# Patient Record
Sex: FEMALE | Race: OTHER | Employment: PART TIME | URBAN - NONMETROPOLITAN AREA
[De-identification: names, ages, dates, MRNs, and addresses within clinical notes are randomized per-mention and may not be internally consistent; named-entity substitution may affect disease eponyms.]

---

## 2017-12-09 ENCOUNTER — HOSPITAL ENCOUNTER (EMERGENCY)
Age: 37
Discharge: HOME | End: 2017-12-09
Attending: FAMILY MEDICINE
Payer: COMMERCIAL

## 2017-12-09 ENCOUNTER — APPOINTMENT (OUTPATIENT)
Dept: CT IMAGING | Age: 37
End: 2017-12-09
Payer: COMMERCIAL

## 2017-12-09 VITALS
WEIGHT: 85 LBS | BODY MASS INDEX: 18.34 KG/M2 | RESPIRATION RATE: 16 BRPM | TEMPERATURE: 99.1 F | SYSTOLIC BLOOD PRESSURE: 103 MMHG | HEART RATE: 81 BPM | DIASTOLIC BLOOD PRESSURE: 75 MMHG | HEIGHT: 57 IN | OXYGEN SATURATION: 100 %

## 2017-12-09 DIAGNOSIS — R42 EPISODIC LIGHTHEADEDNESS: ICD-10-CM

## 2017-12-09 DIAGNOSIS — R53.1 PARESTHESIAS WITH SUBJECTIVE WEAKNESS: Primary | ICD-10-CM

## 2017-12-09 DIAGNOSIS — R20.2 PARESTHESIAS WITH SUBJECTIVE WEAKNESS: Primary | ICD-10-CM

## 2017-12-09 LAB
AMPHETAMINE+METHAMPHETAMINE URINE SCREEN: NEGATIVE
ANION GAP SERPL CALCULATED.3IONS-SCNC: 15 MEQ/L (ref 8–16)
BARBITURATE QUANTITATIVE URINE: NEGATIVE
BASOPHILS # BLD: 0.7 %
BASOPHILS ABSOLUTE: 0 THOU/MM3 (ref 0–0.1)
BENZODIAZEPINE QUANTITATIVE URINE: NEGATIVE
BILIRUBIN URINE: NEGATIVE
BLOOD, URINE: NEGATIVE
BUN BLDV-MCNC: 11 MG/DL (ref 7–22)
CALCIUM SERPL-MCNC: 9.5 MG/DL (ref 8.5–10.5)
CANNABINOID QUANTITATIVE URINE: NEGATIVE
CHARACTER, URINE: CLEAR
CHLORIDE BLD-SCNC: 100 MEQ/L (ref 98–111)
CO2: 24 MEQ/L (ref 23–33)
COCAINE METABOLITE QUANTITATIVE URINE: NEGATIVE
COLOR: YELLOW
CREAT SERPL-MCNC: 0.6 MG/DL (ref 0.4–1.2)
EKG ATRIAL RATE: 80 BPM
EKG P AXIS: 62 DEGREES
EKG P-R INTERVAL: 138 MS
EKG Q-T INTERVAL: 370 MS
EKG QRS DURATION: 82 MS
EKG QTC CALCULATION (BAZETT): 426 MS
EKG R AXIS: 58 DEGREES
EKG T AXIS: 42 DEGREES
EKG VENTRICULAR RATE: 80 BPM
EOSINOPHIL # BLD: 0.5 %
EOSINOPHILS ABSOLUTE: 0 THOU/MM3 (ref 0–0.4)
GFR SERPL CREATININE-BSD FRML MDRD: > 90 ML/MIN/1.73M2
GLUCOSE BLD-MCNC: 121 MG/DL (ref 70–108)
GLUCOSE, URINE: NEGATIVE MG/DL
HCT VFR BLD CALC: 43.5 % (ref 37–47)
HEMOGLOBIN: 14.8 GM/DL (ref 12–16)
KETONES, URINE: NEGATIVE
LEUKOCYTE EST, POC: NEGATIVE
LYMPHOCYTES # BLD: 24.9 %
LYMPHOCYTES ABSOLUTE: 1.7 THOU/MM3 (ref 1–4.8)
MCH RBC QN AUTO: 29.7 PG (ref 27–31)
MCHC RBC AUTO-ENTMCNC: 34.1 GM/DL (ref 33–37)
MCV RBC AUTO: 87.2 FL (ref 81–99)
MONOCYTES # BLD: 6.1 %
MONOCYTES ABSOLUTE: 0.4 THOU/MM3 (ref 0.4–1.3)
NITRITE, URINE: NEGATIVE
NUCLEATED RED BLOOD CELLS: 0 /100 WBC
OPIATES, URINE: NEGATIVE
OSMOLALITY CALCULATION: 278.2 MOSMOL/KG (ref 275–300)
OXYCODONE: NEGATIVE
PDW BLD-RTO: 12 % (ref 11.5–14.5)
PH UA: 7
PHENCYCLIDINE QUANTITATIVE URINE: NEGATIVE
PLATELET # BLD: 348 THOU/MM3 (ref 130–400)
PMV BLD AUTO: 8 MCM (ref 7.4–10.4)
POTASSIUM SERPL-SCNC: 4.3 MEQ/L (ref 3.5–5.2)
PREGNANCY, URINE: NEGATIVE
PROTEIN UA: NEGATIVE MG/DL
RBC # BLD: 4.99 MILL/MM3 (ref 4.2–5.4)
SEG NEUTROPHILS: 67.8 %
SEGMENTED NEUTROPHILS ABSOLUTE COUNT: 4.7 THOU/MM3 (ref 1.8–7.7)
SODIUM BLD-SCNC: 139 MEQ/L (ref 135–145)
SPECIFIC GRAVITY UA: 1.01 (ref 1–1.03)
UROBILINOGEN, URINE: 0.2 EU/DL
WBC # BLD: 6.9 THOU/MM3 (ref 4.8–10.8)

## 2017-12-09 PROCEDURE — 36415 COLL VENOUS BLD VENIPUNCTURE: CPT

## 2017-12-09 PROCEDURE — 81025 URINE PREGNANCY TEST: CPT

## 2017-12-09 PROCEDURE — 85025 COMPLETE CBC W/AUTO DIFF WBC: CPT

## 2017-12-09 PROCEDURE — 81003 URINALYSIS AUTO W/O SCOPE: CPT

## 2017-12-09 PROCEDURE — 80048 BASIC METABOLIC PNL TOTAL CA: CPT

## 2017-12-09 PROCEDURE — 99285 EMERGENCY DEPT VISIT HI MDM: CPT

## 2017-12-09 PROCEDURE — 70450 CT HEAD/BRAIN W/O DYE: CPT

## 2017-12-09 PROCEDURE — 93005 ELECTROCARDIOGRAM TRACING: CPT

## 2017-12-09 PROCEDURE — 80307 DRUG TEST PRSMV CHEM ANLYZR: CPT

## 2017-12-09 NOTE — ED PROVIDER NOTES
[address] [facility] [address] [address] EMERGENCY DEPT      CHIEF COMPLAINT       Chief Complaint   Patient presents with    Dizziness    Numbness       Nurses Notes reviewed and I agree except as noted in the HPI. HISTORY OF PRESENT ILLNESS    Monet Huitron is a 40 y.o. female who presents to the emergency department with 3 episodes of acute  numbness and objective weakness. The pt describes an episode that first occurred approximately 4 hours ago, after she had had her morning coffee. The episode started with numbness that started as perioral, followed by blurry vision, then numbness  spread to her whole face and head, felt her heart pounding. Sat down and all symptoms except the the perioral numbness resolved, and she noticed that she felt numbness in her left upper extremity. Hadnt eaten , so after this episode had a good breakfast, despite the continued perioral numbness. The pt had a second episode about 45 minutes later, again with facial numbness, heavy headedness, felt lightheadedness, felt her heart pounding. Lasted about 2 minutes, then resolved spontaneously, again except for the perioral numbness. The pt called her mother in law, who came over and checked her blood pressure, which the pt states was normal. A third episode occurred within another hour, again lasted a few minutes, then resolved, again except for the paresthesias. Patient denies any previous similar occurrences. She denies any use of new medications, either over-the-counter or prescription. She denies illicit drug use. Kathia Andrews       REVIEW OF SYSTEMS     Constitutional:  Denies fever, chills , diaphoresis  Eyes:  Denies double vision, loss of vision, changes in vision    HENT:  Denies ear pain or tinnitus, facial pain, sore throat, stiff neck   Respiratory:  Denies shortness of breath  Cardiovascular:  Denies chest pain or palpitations  GI:  Denies  nausea, vomiting,  diarrhea  : denies dysuria  GYN: Denies abnormal vaginal discharge  Musculoskeletal:  Denies reassuring. This case was then discussed with neurology on-call (Dr. Marie Mina) who added vasculitis to list differential.  He has agreed to see the patient as an outpatient consult. CONSULTS:  Dr. Marie Mina (neurology)    PROCEDURES:  None    FINAL IMPRESSION      1. Paresthesias with subjective weakness    2. Episodic lightheadedness          DISPOSITION/PLAN     1. Paresthesias with subjective weakness    2. Episodic lightheadedness     the patient is stable for discharge. The etiology of her symptoms has not been discovered today. She may require further workup. The patient and her  given resources to follow up with neurology. Additionally, because the cause of her symptoms has not been discovered, warning signs for prompt return to the emergency department were discussed with the patient and her . PATIENT REFERRED TO:  Samy Rogers MD  [address] [place], [place] [address] [place] [address]  [phone]    Call in 2 days  for continued care      DISCHARGE MEDICATIONS:  There are no discharge medications for this patient.       (Please note that portions of this note were completed with a voice recognition program.  Efforts were made to edit the dictations but occasionally words are mis-transcribed.)    MD Yeyo Pedro MD  12/09/17 [id number]